# Patient Record
Sex: MALE | Race: WHITE | NOT HISPANIC OR LATINO | Employment: FULL TIME | URBAN - METROPOLITAN AREA
[De-identification: names, ages, dates, MRNs, and addresses within clinical notes are randomized per-mention and may not be internally consistent; named-entity substitution may affect disease eponyms.]

---

## 2019-01-12 ENCOUNTER — OFFICE VISIT (OUTPATIENT)
Dept: URGENT CARE | Facility: CLINIC | Age: 49
End: 2019-01-12
Payer: OTHER MISCELLANEOUS

## 2019-01-12 VITALS
BODY MASS INDEX: 33.37 KG/M2 | SYSTOLIC BLOOD PRESSURE: 147 MMHG | WEIGHT: 260 LBS | DIASTOLIC BLOOD PRESSURE: 88 MMHG | TEMPERATURE: 98 F | OXYGEN SATURATION: 97 % | HEART RATE: 78 BPM | RESPIRATION RATE: 18 BRPM | HEIGHT: 74 IN

## 2019-01-12 DIAGNOSIS — T14.8XXA ABRASION: ICD-10-CM

## 2019-01-12 DIAGNOSIS — Z77.21 EXPOSURE TO BLOOD OR BODY FLUID: Primary | ICD-10-CM

## 2019-01-12 PROCEDURE — 36415 COLL VENOUS BLD VENIPUNCTURE: CPT | Mod: S$GLB,,, | Performed by: FAMILY MEDICINE

## 2019-01-12 PROCEDURE — 99203 PR OFFICE/OUTPT VISIT, NEW, LEVL III, 30-44 MIN: ICD-10-PCS | Mod: S$GLB,,, | Performed by: FAMILY MEDICINE

## 2019-01-12 PROCEDURE — 99203 OFFICE O/P NEW LOW 30 MIN: CPT | Mod: S$GLB,,, | Performed by: FAMILY MEDICINE

## 2019-01-12 PROCEDURE — 99000 SPECIMEN HANDLING OFFICE-LAB: CPT | Mod: S$GLB,,, | Performed by: FAMILY MEDICINE

## 2019-01-12 PROCEDURE — 99000 PR SPECIMEN HANDLING,DR OFF->LAB: ICD-10-PCS | Mod: S$GLB,,, | Performed by: FAMILY MEDICINE

## 2019-01-12 PROCEDURE — 36415 PR COLLECTION VENOUS BLOOD,VENIPUNCTURE: ICD-10-PCS | Mod: S$GLB,,, | Performed by: FAMILY MEDICINE

## 2019-01-12 RX ORDER — ZOLPIDEM TARTRATE 10 MG/1
TABLET ORAL
COMMUNITY

## 2019-01-12 RX ORDER — METFORMIN HYDROCHLORIDE 500 MG/1
TABLET ORAL
COMMUNITY

## 2019-01-12 RX ORDER — TRAZODONE HYDROCHLORIDE 50 MG/1
TABLET ORAL
Refills: 5 | COMMUNITY
Start: 2018-12-17

## 2019-01-12 NOTE — PATIENT INSTRUCTIONS
PLEASE READ YOUR DISCHARGE INSTRUCTIONS ENTIRELY AS IT CONTAINS IMPORTANT INFORMATION.    We will call you with the results     Retest in 3 weeks        You must understand that you have received an Urgent Care treatment only and that you may be released before all of your medical problems are known or treated.    Body Fluid Exposure (Non-Occupational)  Two serious illnesses can be spread through body fluid exposure:  · HIV  · Hepatitis (types B, C and D)  Most people exposed 1 time to the body fluid of a person infected with HIV or hepatitis do not get the virus. However, exposure must be taken very seriously. Both HIV and hepatitis virus infection can lead to chronic illness and death. The risk of infection depends on the type of exposure.  Type of exposure to + HIV source Risk   Needle stick  3 out of 1000 exposures   Needle sharing with drug injection          7 out of 1000 exposures   Anal, vaginal, oral intercourse 1 or less per 1000 sex acts, but for receptive anal intercourse it is 1 per 200 sex acts     [Receptive anal intercourse is the highest risk]   If you have not been immunized against Hepatitis B, the risk of becoming infected with Hepatitis B and C after a single exposure is much higher than with HIV. For needle stick or sexual exposures, the risk is 6% to 30% (6 to 30 out of 100 exposures) for Hepatitis B. The risk of becoming infected after similar exposure to someone with Hepatitis C is 1% to 10% (1 to10 out of 100 exposures).  If you are in a sexual relationship, discuss your exposure and its risks with your partner. Consider abstaining from sex or using condoms and avoiding pregnancy until test results of the person who exposed you is negative, or your follow-up testing is done. Do not donate blood, tissue, or semen. If you are a woman who is breast feeding, discuss the risks to your infant with your doctor.  Testing  Initial testing for HIV and hepatitis status will be done on you today. If  the HIV and hepatitis status of the person you were exposed to is not known, try to make sure to have that person tested. If that person is positive or unknown, and your results are negative, you will need to have more blood tests at a later time to find out if infection has occurred. It can take up to 8 weeks for blood tests to turn positive for hepatitis. If HIV infection has occurred, the test usually becomes positive by 3 months after exposure, but a positive result could be delayed up to 6 months after exposure. Therefore, repeat HIV testing may be done in 6 and 12 weeks, and again at 6 months after exposure. If tests are negative for hepatitis and HIV on final follow-up testing, you can assume that you were not infected as a result of this exposure.  Post-exposure prophylaxis (PEP)  To protect you from Hepatitis B, treatment will depend on the status of the person who exposed you, and whether you have been previously vaccinated. If you have not been previously vaccinated, you can receive the first dose of the vaccination series today.  There is no preventive treatment or vaccine for hepatitis C or D.  Based on how recently the exposure occurred, the type of  exposure, and the risk of HIV in the person who you were exposed to, preventive treatment with antiviral medicine may be advised. Treatment consists of 2 or 3 oral medicines taken 1 to 2 times a day for 4 weeks. It is recommended to start the treatment as soon as possible after the exposure. Since treatment may be started before test results are known, it can be stopped if the source patient test results are negative.  Facts you need to know before making a treatment decision  · There is only limited information about the effectiveness and toxicity of the drugs used for post exposure prophylaxis in people without HIV infection.  · Although the short-term toxicity of anti-viral drugs is usually limited, serious adverse events have occurred.  · Be sure you  understand the risk of transmission of disease and the risk/benefit of treatment before making your decision. If you are not sure, ask for more information.  · You may refuse or stop post exposure prophylaxix treatment at any time.  When to seek medical advice  Call your healthcare provider right away if any of these occur:  · Unexplained fever over 100.4°F (38.0°C), or as advised  · Swollen lymph glands  · Sore throat  · Rash  · Muscle or joint aching  · Prolonged or recurring diarrhea, nausea, or vomiting  · Frequent headaches  · Dark urine or light colored stools  · Jaundice (yellow color to skin or eyes)  · Abdominal pain  · Unusual and prolonged fatigue  Date Last Reviewed: 7/2/2016  © 3505-7314 [employer]. [address] [place] [address], [place] [address]. All rights reserved. This information is not intended as a substitute for professional medical care. Always follow your healthcare professional's instructions.
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
## 2019-01-15 LAB
HAV IGM SERPL QL IA: NEGATIVE
HBV CORE IGM SERPL QL IA: NEGATIVE
HBV SURFACE AG SERPL QL IA: NEGATIVE
HCV AB S/CO SERPL IA: <0.1 S/CO RATIO (ref 0–0.9)
HIV 1+2 AB+HIV1 P24 AG SERPL QL IA: NON REACTIVE

## 2019-01-18 ENCOUNTER — TELEPHONE (OUTPATIENT)
Dept: URGENT CARE | Facility: CLINIC | Age: 49
End: 2019-01-18

## 2019-01-18 NOTE — TELEPHONE ENCOUNTER
----- Message from Dino Milligan NP sent at 1/18/2019  5:32 PM CST -----  Okay to call pt with result: hepatitis panel and HIV negative, follow up with occupational health in 6 WEEKS for retesting

## 2019-01-22 ENCOUNTER — TELEPHONE (OUTPATIENT)
Dept: URGENT CARE | Facility: CLINIC | Age: 49
End: 2019-01-22

## 2019-04-09 ENCOUNTER — OFFICE VISIT (OUTPATIENT)
Dept: SLEEP MEDICINE | Facility: CLINIC | Age: 49
End: 2019-04-09
Payer: COMMERCIAL

## 2019-04-09 VITALS
DIASTOLIC BLOOD PRESSURE: 85 MMHG | WEIGHT: 294.88 LBS | HEART RATE: 84 BPM | BODY MASS INDEX: 37.85 KG/M2 | HEIGHT: 74 IN | SYSTOLIC BLOOD PRESSURE: 126 MMHG

## 2019-04-09 DIAGNOSIS — G47.33 OSA (OBSTRUCTIVE SLEEP APNEA): Primary | ICD-10-CM

## 2019-04-09 PROCEDURE — 99999 PR PBB SHADOW E&M-EST. PATIENT-LVL III: CPT | Mod: PBBFAC,,, | Performed by: PSYCHIATRY & NEUROLOGY

## 2019-04-09 PROCEDURE — 99204 OFFICE O/P NEW MOD 45 MIN: CPT | Mod: S$GLB,,, | Performed by: PSYCHIATRY & NEUROLOGY

## 2019-04-09 PROCEDURE — 99999 PR PBB SHADOW E&M-EST. PATIENT-LVL III: ICD-10-PCS | Mod: PBBFAC,,, | Performed by: PSYCHIATRY & NEUROLOGY

## 2019-04-09 PROCEDURE — 99204 PR OFFICE/OUTPT VISIT, NEW, LEVL IV, 45-59 MIN: ICD-10-PCS | Mod: S$GLB,,, | Performed by: PSYCHIATRY & NEUROLOGY

## 2019-04-09 PROCEDURE — 3008F PR BODY MASS INDEX (BMI) DOCUMENTED: ICD-10-PCS | Mod: CPTII,S$GLB,, | Performed by: PSYCHIATRY & NEUROLOGY

## 2019-04-09 PROCEDURE — 3008F BODY MASS INDEX DOCD: CPT | Mod: CPTII,S$GLB,, | Performed by: PSYCHIATRY & NEUROLOGY

## 2019-04-09 RX ORDER — TESTOSTERONE CYPIONATE 1000 MG/10ML
100 INJECTION, SOLUTION INTRAMUSCULAR
COMMUNITY

## 2019-04-09 NOTE — PROGRESS NOTES
Dru Lozada  was seen at the request of  Self, [facility] for sleep evaluation.    04/09/2019 INITIAL HISTORY OF PRESENT ILLNESS:  Dru Lozada is a 48 y.o. male is here to be evaluated for a sleep disorder.       CHIEF COMPLAINT:      The patient's complaints include excessive daytime sleepiness, excessive daytime fatigue, snoring,  witnessed breathing pauses,  gasping for air in sleep and interrupted sleep .      Working variable hours - up 6 PM and working 10:30 Pm to 7:30 AM -> going to the gym -> gets to sleep 9: AM till 6 PM.  His work is very random.      Taking Ambien or Trazodone to sleep on those nights. Alternates with Lunesta.  Melatonin - no good effect.    Does not have good response to Melatonin    Has always had a hard time sleepiness.    + nightmares.    ALICIA surgery 2016 with comparments -> had been using APAP since then.    His machine (purchased per cash) is broken and needs replacement.    Using Modafinil (shift worker)  Does not drink caffeine before bed, but sometimes nicotine to prevent drowsy driving  Not a smoker.      EPWORTH SLEEPINESS SCALE 4/9/2019   Sitting and reading 2   Watching TV 2   Sitting, inactive in a public place (e.g. a theatre or a meeting) 1   As a passenger in a car for an hour without a break 3   Lying down to rest in the afternoon when circumstances permit 1   Sitting and talking to someone 0   Sitting quietly after a lunch without alcohol 1   In a car, while stopped for a few minutes in traffic 1   Total score 11       PHQ9 4/9/2019   Little interest or pleasure in doing things: Nearly every day   Feeling down, depressed or hopeless: Several days   Trouble falling asleep, staying asleep, or sleeping too much: Nearly every day   Feeling tired or having little energy: More than half the days   Poor appetite or overeating: Nearly every day   Feeling bad about yourself- or that you are a failure or have let yourself or family down Several days   Trouble  concentrating on things, such as reading the newspaper or watching television: Not at all   Moving or speaking so slowly that other people could have noticed. Or the opposite- being so fidgety or restless that you have been moving around a lot more than usual: Not at all   Thoughts that you would be better off dead or hurting yourself in some way: Not at all   If you indicated you have experienced any of the aforementioned problems, how difficult have these problems made it for you to do your work, take care of things at home or get along with other people? Very difficult   Total Score 13     GAD7 4/9/2019   Feeling nervous, anxious, on edge Several days   Not being able to stop or control worrying Several days   Worrying too much about different things Several days   Trouble relaxing More than half the days   Being so restless that its hard to sit still Not at all   Becoming easily annoyed or irritable Not at all   Feeling afraid as if something awful might happen Not at all   If you marked you are experiencing any of the aforementioned problems, how difficult have these made it for you to do your work, take care of things at home, or get along with other people? Very difficult   TOMA-7 Score 5             SLEEP ROUTINE AND LIFESTYLE 04/09/2019 :  Sleep Clinic New Patient 4/9/2019   What time do you go to bed on a week day? (Give a range) 9:00am   What time do you go to bed on a day off? (Give a range) varies   How long does it take you to fall asleep? (Give a range) 30min-1hour   On average, how many times per night do you wake up? 6   How long does it take you to fall back into sleep? (Give a range) 2 hours   What time do you wake up to start your day on a week day? (Give a range) 5:00p-6:00p   What time do you wake up to start your day on a day off? (Give a range) varies   What time do you get out of bed? (Give a range) 6:00p   On average, how many hours do you sleep? 6   On average, how many naps do you take  per day? 0   Rate your sleep quality from 0 to 5 (0-poor, 5-great). 1   Have you experienced:  N/a   How much weight have you lost or gained (in lbs.) in the last year? gained 25lbs   On average, how many times per night do you go to the bathroom?  2   Have you ever had a sleep study/CPAP machine/surgery for sleep apnea? Yes   Have you ever had a CPAP machine for sleep apnea? Yes   Have you ever had surgery for sleep apnea? Yes       Sleep Clinic ROS  4/9/2019   Difficulty breathing through the nose?  Sometimes   Sore throat or dry mouth in the morning? Sometimes   Irregular or very fast heart beat?  Sometimes   Shortness of breath?  Sometimes   Acid reflux? Yes   Body aches and pains?  Yes   Morning headaches? No   Dizziness? Sometimes   Mood changes?  No   Do you exercise?  Yes   Do you feel like moving your legs a lot?  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      DME:       PAST MEDICAL HISTORY:    Active Ambulatory Problems     Diagnosis Date Noted    No Active Ambulatory Problems     Resolved Ambulatory Problems     Diagnosis Date Noted    No Resolved Ambulatory Problems     Past Medical History:   Diagnosis Date    Pre-diabetes                 PAST SURGICAL HISTORY:    No past surgical history on file.      FAMILY HISTORY:                Family History   Family history unknown: Yes       SOCIAL HISTORY:          Tobacco:   Social History     Tobacco Use   Smoking Status Never Smoker   Smokeless Tobacco Never Used       alcohol use:    Social History     Substance and Sexual Activity   Alcohol Use Yes                   ALLERGIES:    Review of patient's allergies indicates:   Allergen Reactions  "   Levofloxacin        CURRENT MEDICATIONS:    Current Outpatient Medications   Medication Sig Dispense Refill    metFORMIN (GLUCOPHAGE) 500 MG tablet metformin 500 mg tablet   TK 1 T PO  BID BEFORE MEALS      testosterone cypionate (DEPOTESTOTERONE CYPIONATE) 100 mg/mL injection Inject 100 mg into the muscle every 14 (fourteen) days.      traZODone (DESYREL) 50 MG tablet TK 1 T PO HS  5    zolpidem (AMBIEN) 10 mg Tab zolpidem 10 mg tablet   TK 1 T PO QHS       No current facility-administered medications for this visit.                       REVIEW OF SYSTEMS:   Sleep related symptoms as per HPI        PHYSICAL EXAM:  /85   Pulse 84   Ht 6' 2" (1.88 m)   Wt 133.7 kg (294 lb 13.8 oz)   BMI 37.86 kg/m²   GENERAL: Normal development, well groomed.  HEENT:   HEENT:  Conjunctivae are non-erythematous; Pupils equal, round, and reactive to light; Nose is symmetrical; Nasal mucosa is pink and moist; Septum is midline; Inferior turbinates are normal; Nasal airflow is normal; Posterior pharynx is pink; Modified Mallampati:III-IV; Posterior palate is low; Tonsils not visualized; Uvula is wide and elongated;Tongue is enlarged; Dentition is fair; No TMJ tenderness; Jaw opening and protrusion without click and without discomfort.  NECK: Supple. Neck circumference is 18 inches. No thyromegaly. No palpable nodes.     SKIN: On face and neck: No abrasions, no rashes, no lesions.  No subcutaneous nodules are palpable.  RESPIRATORY: Chest is clear to auscultation.  Normal chest expansion and non-labored breathing at rest.  CARDIOVASCULAR: Normal S1, S2.  No murmurs, gallops or rubs. No carotid bruits bilaterally.  No edema. No clubbing. No cyanosis.    NEURO: Oriented to time, place and person. Normal attention span and concentration. Gait normal.    PSYCH: Affect is full. Mood is normal  MUSCULOSKELETAL: Moves 4 extremities. Gait normal.         Using [facility]: no      ASSESSMENT:    1. ALICIA. The patient " "symptomatically has  excessive daytime sleepiness, snoring,  witnessed breathing pauses, excessive daytime fatigue, gasping for air in sleep, interrupted sleep and nocturia  with exam findings of "a crowded oral airway and elevated body mass index. The patient has medical co-morbidities of hyperlipidemia and hypertension,  which can be worsened by ALICIA. This warrants further investigation for possible obstructive sleep apnea.          PLAN:    Will order APAP 5-20 cm H2O.  HST nel [place] will be scanned in Media.      More than 15 minutes of this 30 minutes visit was spent in counseling: during our discussion today, we talked about the etiology of  ALICIA as well as the potential ramifications of untreated sleep apnea, which could include daytime sleepiness, hypertension, heart disease and/or stroke.  We discussed potential treatment options, which could include weight loss, body positioning, continuous positive airway pressure (CPAP), or referral for surgical consideration. Meanwhile, he  is urged to avoid supine sleep, weight gain and alcoholic beverages since all of these can worsen ALICIA.     Precautions: The patient was advised to abstain from driving should he feel sleepy or drowsy.    Follow up: MD/NP in 2-3 months for APAP assessment.    Thank you for allowing me the opportunity to participate in the care of your patient.    This visit summary will be sent to referring provider via inbasket      "

## 2019-04-09 NOTE — PATIENT INSTRUCTIONS
DME [facility]:  [phone] -to call about CPAP related questions    ------------------------------------------------------------------------------------------    Any questions about the ordering process/getting study from [employer] [place] [employer] [place]/CPAp discomfort of any sort - please call me, or sent me [facility] message.  Dr. Ramirez office  [phone] (ext [phone]).  --------------------------------------------------------------------------------------

## 2023-05-04 ENCOUNTER — HOSPITAL ENCOUNTER (EMERGENCY)
Facility: HOSPITAL | Age: 53
Discharge: HOME | End: 2023-05-04
Attending: EMERGENCY MEDICINE
Payer: COMMERCIAL

## 2023-05-04 VITALS
HEART RATE: 78 BPM | WEIGHT: 285 LBS | DIASTOLIC BLOOD PRESSURE: 66 MMHG | SYSTOLIC BLOOD PRESSURE: 120 MMHG | HEIGHT: 75 IN | TEMPERATURE: 98 F | OXYGEN SATURATION: 95 % | RESPIRATION RATE: 16 BRPM | BODY MASS INDEX: 35.43 KG/M2

## 2023-05-04 DIAGNOSIS — S16.1XXA STRAIN OF NECK MUSCLE, INITIAL ENCOUNTER: ICD-10-CM
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
## 2023-05-04 DIAGNOSIS — S39.012A STRAIN OF LUMBAR REGION, INITIAL ENCOUNTER: ICD-10-CM

## 2023-05-04 PROCEDURE — 63600175 PHARM REV CODE 636 W HCPCS: Performed by: EMERGENCY MEDICINE

## 2023-05-04 PROCEDURE — 25000003 PHARM REV CODE 250: Performed by: EMERGENCY MEDICINE

## 2023-05-04 PROCEDURE — 99284 EMERGENCY DEPT VISIT MOD MDM: CPT | Mod: 25

## 2023-05-04 RX ORDER — PREDNISONE 20 MG/1
20 TABLET ORAL DAILY
Qty: 10 TABLET | Refills: 0 | Status: SHIPPED | OUTPATIENT
Start: 2023-05-04 | End: 2023-05-09

## 2023-05-04 RX ORDER — PREDNISONE 20 MG/1
60 TABLET ORAL
Status: COMPLETED | OUTPATIENT
Start: 2023-05-04 | End: 2023-05-04

## 2023-05-04 RX ORDER — OXYCODONE AND ACETAMINOPHEN 5; 325 MG/1; MG/1
1 TABLET ORAL
Status: COMPLETED | OUTPATIENT
Start: 2023-05-04 | End: 2023-05-04

## 2023-05-04 RX ORDER — NAPROXEN 500 MG/1
500 TABLET ORAL 2 TIMES DAILY WITH MEALS
Qty: 30 TABLET | Refills: 0 | Status: SHIPPED | OUTPATIENT
Start: 2023-05-04

## 2023-05-04 RX ORDER — CYCLOBENZAPRINE HCL 5 MG
5 TABLET ORAL
Status: COMPLETED | OUTPATIENT
Start: 2023-05-04 | End: 2023-05-04

## 2023-05-04 RX ORDER — METHOCARBAMOL 500 MG/1
1000 TABLET, FILM COATED ORAL 3 TIMES DAILY
Qty: 30 TABLET | Refills: 0 | Status: SHIPPED | OUTPATIENT
Start: 2023-05-04 | End: 2023-05-09

## 2023-05-04 RX ADMIN — CYCLOBENZAPRINE 5 MG: 5 TABLET, FILM COATED ORAL at 05:05

## 2023-05-04 RX ADMIN — OXYCODONE AND ACETAMINOPHEN 1 TABLET: 325; 5 TABLET ORAL at 05:05

## 2023-05-04 RX ADMIN — PREDNISONE 60 MG: 20 TABLET ORAL at 05:05
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
## 2023-05-04 NOTE — Clinical Note
"Dru "Dru" Neville was seen and treated in our emergency department on 5/4/2023.  He may return to work on 05/06/2023.       If you have any questions or concerns, please don't hesitate to call.      Clementine Soares MD"

## 2023-05-05 NOTE — DISCHARGE INSTRUCTIONS
Follow-up with your neurosurgeon.  Rest.  Return to emergency department for worsening symptoms or any problems.  No bending or stooping or heavy weightlifting.  Return to emergency department for worsening symptoms or any problems and if you have any tingling in the extremities you must get further evaluation with MRI of your cervical spine and lumbar spine.

## 2023-08-31 ENCOUNTER — PROCEDURE (OUTPATIENT)
Facility: LOCATION | Age: 53
End: 2023-08-31
Payer: COMMERCIAL

## 2023-10-26 ENCOUNTER — PROCEDURE (OUTPATIENT)
Facility: LOCATION | Age: 53
End: 2023-10-26
Payer: COMMERCIAL